# Patient Record
Sex: MALE | Race: WHITE | Employment: UNEMPLOYED | URBAN - METROPOLITAN AREA
[De-identification: names, ages, dates, MRNs, and addresses within clinical notes are randomized per-mention and may not be internally consistent; named-entity substitution may affect disease eponyms.]

---

## 2017-12-25 ENCOUNTER — HOSPITAL ENCOUNTER (EMERGENCY)
Age: 77
Discharge: HOME | End: 2017-12-25
Attending: EMERGENCY MEDICINE
Payer: SELF-PAY

## 2017-12-25 VITALS
BODY MASS INDEX: 33.87 KG/M2 | HEART RATE: 94 BPM | OXYGEN SATURATION: 97 % | HEIGHT: 65 IN | WEIGHT: 203.3 LBS | RESPIRATION RATE: 18 BRPM | DIASTOLIC BLOOD PRESSURE: 93 MMHG | TEMPERATURE: 97.8 F | SYSTOLIC BLOOD PRESSURE: 158 MMHG

## 2017-12-25 DIAGNOSIS — M10.9 ACUTE GOUT, UNSPECIFIED CAUSE, UNSPECIFIED SITE: Primary | ICD-10-CM

## 2017-12-25 PROCEDURE — 99283 EMERGENCY DEPT VISIT LOW MDM: CPT

## 2017-12-25 PROCEDURE — 74011250637 HC RX REV CODE- 250/637: Performed by: PHYSICIAN ASSISTANT

## 2017-12-25 PROCEDURE — 74011636637 HC RX REV CODE- 636/637: Performed by: PHYSICIAN ASSISTANT

## 2017-12-25 RX ORDER — PREDNISONE 50 MG/1
50 TABLET ORAL DAILY
Qty: 4 TAB | Refills: 0 | Status: SHIPPED | OUTPATIENT
Start: 2017-12-25 | End: 2017-12-29

## 2017-12-25 RX ORDER — INDOMETHACIN 25 MG/1
25 CAPSULE ORAL
Qty: 20 CAP | Refills: 0 | Status: SHIPPED | OUTPATIENT
Start: 2017-12-25 | End: 2017-12-30

## 2017-12-25 RX ORDER — TRAMADOL HYDROCHLORIDE 50 MG/1
100 TABLET ORAL
Status: COMPLETED | OUTPATIENT
Start: 2017-12-25 | End: 2017-12-25

## 2017-12-25 RX ORDER — PREDNISONE 20 MG/1
60 TABLET ORAL
Status: COMPLETED | OUTPATIENT
Start: 2017-12-25 | End: 2017-12-25

## 2017-12-25 RX ORDER — TRAMADOL HYDROCHLORIDE 50 MG/1
50-100 TABLET ORAL
Qty: 20 TAB | Refills: 0 | Status: SHIPPED | OUTPATIENT
Start: 2017-12-25

## 2017-12-25 RX ORDER — INDOMETHACIN 25 MG/1
25 CAPSULE ORAL
Qty: 20 CAP | Refills: 0 | Status: SHIPPED | OUTPATIENT
Start: 2017-12-25 | End: 2017-12-25

## 2017-12-25 RX ADMIN — TRAMADOL HYDROCHLORIDE 100 MG: 50 TABLET, COATED ORAL at 13:45

## 2017-12-25 RX ADMIN — PREDNISONE 60 MG: 20 TABLET ORAL at 13:45

## 2017-12-25 NOTE — ED NOTES
Emergency Department Nursing Plan of Care       The Nursing Plan of Care is developed from the Nursing assessment and Emergency Department Attending provider initial evaluation. The plan of care may be reviewed in the ED Provider note. The Plan of Care was developed with the following considerations:   Patient / Family readiness to learn indicated by:verbalized understanding  Persons(s) to be included in education: patient and family  Barriers to Learning/Limitations:Wil Barajas Bench    12/25/2017   1:04 PM    See nursing assessment    Patient is alert and oriented x 4 and in no acute distress at this time. Respirations are at a regular rate, depth, and pattern. Patient updated on plan of care and has no questions or concerns at this time.

## 2017-12-25 NOTE — ED PROVIDER NOTES
Patient is a 78 y.o. male presenting with gout. Gout    Pertinent negatives include no back pain and no neck pain. To ED with complaints of leg pains. Translation per blue phone translation and adult son with patients permission. Has had about one week of sharp pain, started at left 1rst MT, now in R foot some in both knees and also R great toe. Is out of his indomethacin. Sts he has h/o gout and this is same pain as prior gout flares. Denies any injury. No fevers. No skin injury to legs. Specifically denies calf pain, CP, SOB. Notes uses inhaler prn \"asthma\". Pain is worse when Left Toe > Right Toe touched and increased pain with walking. Pain is sharp and moderate to severe. Past Medical History:   Diagnosis Date    Asthma     Gout     Hypertension        History reviewed. No pertinent surgical history. History reviewed. No pertinent family history. Social History     Social History    Marital status:      Spouse name: N/A    Number of children: N/A    Years of education: N/A     Occupational History    Not on file. Social History Main Topics    Smoking status: Never Smoker    Smokeless tobacco: Not on file    Alcohol use No    Drug use: No    Sexual activity: Not on file     Other Topics Concern    Not on file     Social History Narrative    No narrative on file         ALLERGIES: Review of patient's allergies indicates no known allergies. Review of Systems   Constitutional: Negative for chills, fatigue and fever. HENT: Negative for congestion, rhinorrhea and sore throat. Eyes: Negative for pain and discharge. Respiratory: Negative for cough and shortness of breath. Cardiovascular: Negative for chest pain. Gastrointestinal: Negative for abdominal pain, nausea and vomiting. Genitourinary: Negative for dysuria, frequency and urgency. Musculoskeletal: Positive for gout. Negative for back pain and neck pain. As per HPI.  Toe and knee pains.    Skin: Negative for rash and wound. Neurological: Negative for seizures, syncope and headaches. Hematological: Does not bruise/bleed easily. Psychiatric/Behavioral: Negative for confusion. The patient is not nervous/anxious. All other systems reviewed and are negative. Vitals:    12/25/17 1251   BP: (!) 158/93   Pulse: 100   Resp: 22   Temp: 97.8 °F (36.6 °C)   SpO2: 95%   Weight: 92.2 kg (203 lb 4.8 oz)   Height: 5' 5\" (1.651 m)            Physical Exam   Constitutional: He is oriented to person, place, and time. He appears well-developed and well-nourished. HENT:   Head: Normocephalic and atraumatic. Mouth/Throat: Oropharynx is clear and moist.   Eyes: Conjunctivae and EOM are normal. Pupils are equal, round, and reactive to light. Neck: Normal range of motion. Neck supple. Cardiovascular: Normal rate, regular rhythm and normal heart sounds. No murmur heard. Pulmonary/Chest: Effort normal and breath sounds normal. He has no wheezes. He has no rales. Abdominal: Soft. Bowel sounds are normal. There is no tenderness. Musculoskeletal: Normal range of motion. Hips FROM B.   Knees with FROM B. No erythema. No effusion. Slight tenderness to medial/lateral joint lines B. No erythema. Left foot: slight swelling to great toe, slight warmth and very slight erythema. No skin disruption. Some pain with left toe ROM. Slight diffuse Left foot swelling. R foot- very slight swelling and slight warmth to great toe and distal medial foot. Some pain with GT ROM. Pedal pulses palp B. Sensation intact. Calves soft and and completely nonteder B. Neurological: He is alert and oriented to person, place, and time. He has normal reflexes. Skin: Skin is warm and dry. Psychiatric: He has a normal mood and affect. His behavior is normal.   Nursing note and vitals reviewed.        MDM  Number of Diagnoses or Management Options  Acute gout, unspecified cause, unspecified site: Diagnosis management comments: DDX: OA, gout, no injury to suggest fx. Calves completely soft and non tender, no clinical evidence of DVT. SX similar to prior gout symptoms. ED Course       Procedures          MEDICATIONS GIVEN:  Medications   predniSONE (DELTASONE) tablet 60 mg (60 mg Oral Given 12/25/17 1345)   traMADol (ULTRAM) tablet 100 mg (100 mg Oral Given 12/25/17 1345)       IMPRESSION:  1. Acute gout, unspecified cause, unspecified site        PLAN:  1. Discharge Medication List as of 12/25/2017  1:46 PM      START taking these medications    Details   predniSONE (DELTASONE) 50 mg tablet Take 1 Tab by mouth daily for 4 days. Start 12/26/2017, Print, Disp-4 Tab, R-0      traMADol (ULTRAM) 50 mg tablet Take 1-2 Tabs by mouth every eight (8) hours as needed for Pain. Max Daily Amount: 300 mg., Print, Disp-20 Tab, R-0      indomethacin (INDOCIN) 25 mg capsule Take 1 Cap by mouth three (3) times daily as needed for up to 5 days. With food. May start when completed with the prednisone., Print, Disp-20 Cap, R-0           2.    Follow-up Information     Follow up With Details Comments [address] [facility],[address] [facility] - [facility]   [address] [facility] [address] [place] [address]    [address]    [facility] EMERGENCY DEPT  If symptoms worsen [address] [facility]        Return to ED if worse

## 2017-12-25 NOTE — ED NOTES
.Patient has been instructed that they have been given tramadol* which contains opioids, benzodiazepines, or other sedating drugs. Patient is aware that they  will need to refrain from driving or operating heavy machinery after taking this medication. Patient also instructed that they need to avoid drinking alcohol and using other products containing opioids, benzodiazepines, or other sedating drugs. Patient verbalized understanding.

## 2017-12-25 NOTE — ED NOTES
Pt discharged per provider with no si/s of acute distress and with pt's family members. Pt escorted to car via wheelchair by ED staff. Nonverbal pain of 1/10.

## 2017-12-25 NOTE — DISCHARGE INSTRUCTIONS
ÇáäÞÑÓ: ÅÑÔÇÏÇÊ ÇáÑÚÇíÉ  [ Gout: Care Instructions ]  ÅÑÔÇÏÇÊ ÇáÑÚÇíÉ ÇáÎÇÕÉ Èß  ÇáäÞÑÓ åæ Ôßá ãä ÃÔßÇá ÇáÊåÇÈ [id number] ÇáäÇÊÌ Úä ÊÑÇßã ÈáæÑÇÊ ÍãÖ ÇáÈæáíß Ýí ÃÍÏ ÇáãÝÇÕá. íÊÓÈÈ Ýí ÍÏæË åÌæã ãÝÇÌÆ ãä KRSGH ÚÇÏÉ áãÝÕá æÇÍÏ æÊæÑãå æÇÍãÑÇÑå æÊÕáÈå æÎÇÕÉ ÇáÃÕÈÚ ÇáßÈíÑ. íÃÊí ÇáäÞÑÓ ÚÇÏÉ Ïæä ÓÈÈ. æáßä íãßä Ãä íÃÊí ãä ÊäÇæá [id number] (æÎÇÕÉ ÇáÈíÑÇ) Ãæ ÊäÇæá AFBENDAPT ÇáÈÍÑíÉ XYBDDDY ÇáÍãÑÇÁ. ßãÇ íãßä Ãä íÊÓÈÈ ÊäÇæá ÃÏæíÉ ãÚíäÉ¡ ãËá ãÏÑÇÊ ÇáÈæá Ãæ ÇáÃÓÈÑíä Ýí ÍÏæË äæÈÉ ÇáäÞÑÓ. íãßä áÊäÇæá ÃÏæíÊß ßãÇ Êã æÕÝåÇ æÇáãÊÇÈÚÉ ãÚ ÇáØÈíÈ ÈÇäÊÙÇã ãÓÇÚÏÊß Ýí ÊÌäÈ ÍÏæË äæÈÇÊ ÇáäÞÑÓ Ýí ÇáãÓÊÞÈá. ÊõÚÏ ÑÚÇíÉ ÇáãÊÇÈÚÉ ÌÒÁðÇ ÃÓÇÓíðÇ Ýí ÚáÇÌß æÓáÇãÊß. ÝÚáíß ÇáÍÑÕ Úáì ÊÑÊíÈ ÌãíÚ ãæÇÚíÏ ÒíÇÑÉ ÇáØÈíÈ LATGARCGR ÈåÇ¡ QNUYTNGT ÈØÈíÈß ÚäÏ [id number] ãä Ãí ãÔßáÇÊ. æãä ÇáÌíÏ ÃíÖðÇ Ãä ÊÚÑÝ äÊÇÆÌ HKESPXAZ æßÐáß [id number] ÈÞÇÆãÉ ÇáÃÏæíÉ ÇáÊí CKOWFSLN. ßíÝ íãßäß ÇáÇÚÊäÇÁ ÈäÝÓß Ýí SBKWWO¿   · íãßäßö æÖÚ ËáÌ Ãæ ÖãÇÏÉ ÈÇÑÏÉ Úáì ÇáãäØÞÉ [id number] áãÏÉ ÊÊÑÇæÍ ãä 10 ÏÞÇÆÞ Åáì 20 ÏÞíÞÉ Ýí ÇáãÑÉ ÇáæÇÍÏÉ. Marene Too ÞãÇÔ ÑÞíÞÉ Èíä ÇáËáÌ æÌáÏß.  · ÇÍãá ÇáØÑÝ ÇáãáÊåÈ Úáì æÓÇÏÉ ÚäÏ æÖÚ ÇáËáÌ Úáíå Ãæ Ãí æÞÊ ÊÌáÓ Ãæ ÊÓÊáÞí ÃËäÇÁ 3 ÃíÇã ÇáÊÇáíÉ. Blade Orlin ÅÈÞÇÁ ÇáãäØÞÉ JBADTVNRC Ýí ãÓÊæì ÃÚáì ãä ãÓÊæì ÇáÞáÈ. ÝåÐÇ ãä ÔÃäå Ãä íÓÇÚÏ Úáì ÊÞáíá ÇáÊæÑã. Eliana Mcneills · ÃÑÍ IMZWGBK ÇáãáÊåÈÉ. ÊÌäÈ ÇáÃäÔØÉ ÇáÊí ÊãËá ÍãáÇð Ãæ ÖÛØðÇ Úáì ÇáãÝÇÕá áÈÖÚÉ ÃíÇã. ÎÐ ÞÓØðÇ ãä MPJZTO ãä ÇáÃäÔØÉ NDBGEELV ÃËäÇÁ Çáíæã.  · ÊäÇæá ÇáÃÏæíÉ ÊãÇãðÇ ÍÓÈ ÊæÌíåÇÊ ÇáØÈíÈ. ÇÊÕá ÈÇáØÈíÈ ÅÐÇ ßäÊ ÊÚÊÞÏ Ãäß ÊæÇÌå ãÔßáÉ ÊÊÚáÞ ÈÇáÏæÇÁ.  · ÊäÇæá ÃÏæíÉ ÊÓßíä ÇáÃáã ÈÏÞÉ æÝÞðÇ áÅÑÔÇÏÇÊ ÇáØÈíÈ. o o ÅÐÇ ÃÚØÇß ÇáØÈíÈ ÏæÇÁð ãä ÇáÃÏæíÉ ÇáÊí ÊõÕÑÝ ÈæÕÝÉ ØÈíÉ áÚáÇÌ ÇáÃáã¡ IIMFJDF æÝÞ ÅÑÔÇÏÇÊå. o o ÅÐÇ áã ÊÊäÇæá ÏæÇÁð áÚáÇÌ ÇáÃáã ãä ÇáÃÏæíÉ ÇáÊí ÊõÕÑÝ ÈæÕÝÉ ØÈíÉ¡ ÝÇÓÃá ØÈíÈß ÅÐÇ ßÇä ÈÅãßÇäß ÊäÇæá ÏæÇÁ ãä ÇáÃÏæíÉ ÇáÊí ÊõÕÑÝ ÈÏæä æÕÝÉ ØÈíÉ.  · Þáá ãä ÊäÇæá [id number] ÇáÈÍÑíÉ IMGEENU ÇáÍãÑÇÁ.  · íÑÌì ÇáÑÌæÚ ááØÈíÈ ÞÈá ÊäÇæá ÇáãÔÑæÈÇÊ ÇáßÍæáíÉ.    · ÞÏ íÓÇÚÏ ÝÞÏÇä ÇáæÒä¡ ÅÐÇ ßäÊ ÊÚÇäí ãä ÒíÇÏÉ ÇáæÒä Ýí ÊÞáíá ÇáÅÕÇÈÉ ÈäæÈÇÊ ÇáäÞÑÓ. æáßä áÇ ÊÊÈÚ \"äÙÇãðÇ ÛÐÇÆíðÇ ÓÑíÚðÇ ááÊäÍíÝ. \" íãßä áÝÞÏÇä ÇáßËíÑ ãä ÇáæÒä Ýí æÞÊ ÞÕíÑ Ãä íÄÏí Åáì äæÈÉ äÞÑÓ. ãÊì íäÈÛí áß ÇáÇÊÕÇá áØáÈ ÇáãÓÇÚÏÉ¿  Úáíß ÇáÇÊÕÇá ÈØÈíÈß Úáì ÇáÝæÑ Ãæ ØáÈ ÑÚÇíÉ ØÈíÉ ÝæÑíÉ Ýí ÇáÍÇáÇÊ ÇáÊÇáíÉ:   · ÅÕÇÈÊß ÈÍãì.  · íÄáãß JVCRKB ÌÏðÇ æáÇ íãßäß ÇÓÊÎÏÇãå.  · ÊÚÇäí ãä Ãáã ÍÇÏ ãÝÇÌÆ Ãæ ÊæÑã ÛíÑ ãÊæÞÚ Ãæ ÇÍãÑÇÑ Ãæ ÓÎæäÉ Ýí ãÝÕá Ãæ ÃßËÑ. Úáíß ãÑÇÞÈÉ Ãí ÊÛíÑÇÊ ÊØÑÃ Úáì ÕÍÊß ÌíÏðÇ¡ CTFBAC Úáì JIFUMZX ÈÇáØÈíÈ Ýí BEEFLHF ÇáÊÇáíÉ:   · ÊÚÇäí ãä Ãáã MVZODELV.  · ÊÓæÁ ÃÚÑÇÖß Ãæ áã ÊÊÍÓä ÈÚÏ íæãíä Ãæ ËáÇËÉ ÃíÇã. Ãíä íãßä ãÚÑÝÉ ÇáãÒíÏ¿  ÇäÊÞÇá Åáì [email or web address] ÏÎæá Dudu.Osgood íãßäß ãÚÑÝÉ ÇáãÒíÏ ãä ÎáÇá ãÑÈÚ ÇáÈÍË \"ÇáäÞÑÓ: ÅÑÔÇÏÇÊ ÇáÑÚÇíÉ - [ Gout: Care Instructions ]. \"  © 2164-1056 [employer], [employer]. ÊãÊ ÊåíÆÉ ÅÑÔÇÏÇÊ ÇáÚäÇíÉ ÈãæÌÈ ÊÑÎíÕ ãä ãÎÊÕ ÇáÑÚÇíÉ ÇáÕÍíÉ áÏíß. ÅÐÇ ßÇäÊ áÏíß ÃíÉ [id number] Úä ÍÇáÉ ØÈíÉ Ãæ Ãí ãä åÐå CCCXUOPWS¡ ÝÊæÌå ÏæãðÇ FIBRDPO Åáì ãÎÊÕ ÇáÑÚÇíÉ ÇáÕÍíÉ. ÊäÝí ãäÙãÉ [employer] Lorren An ÖãÇä Ãæ Vidal L[employer] QSUTRNP åÐå [id number]. ÅÕÏÇÑ ÇáãÍÊæì: 11.4 ãÍÏøË ÇÚÊÈÇÑðÇ ãä: 30 ãÍÑã 1438           Gout: Care Instructions  Your Care Instructions    Gout is a form of arthritis caused by a buildup of uric acid crystals in a joint. It causes sudden attacks of pain, swelling, redness, and stiffness, usually in one joint, especially the big toe. Gout usually comes on without a cause. But it can be brought on by drinking alcohol (especially beer) or eating seafood and red meat. Taking certain medicines, such as diuretics or aspirin, also can bring on an attack of gout. Taking your medicines as prescribed and following up with your doctor regularly can help you avoid gout attacks in the future. Follow-up care is a key part of your treatment and safety. Be sure to make and go to all appointments, and call your doctor if you are having problems. It's also a good idea to know your test results and keep a list of the medicines you take.   How can you care for yourself at home? · If the joint is swollen, put ice or a cold pack on the area for 10 to 20 minutes at a time. Put a thin cloth between the ice and your skin. · Prop up the sore limb on a pillow when you ice it or anytime you sit or lie down during the next 3 days. Try to keep it above the level of your heart. This will help reduce swelling. · Rest sore joints. Avoid activities that put weight or strain on the joints for a few days. Take short rest breaks from your regular activities during the day. · Take your medicines exactly as prescribed. Call your doctor if you think you are having a problem with your medicine. · Take pain medicines exactly as directed. ¨ If the doctor gave you a prescription medicine for pain, take it as prescribed. ¨ If you are not taking a prescription pain medicine, ask your doctor if you can take an over-the-counter medicine. · Eat less seafood and red meat. · Check with your doctor before drinking alcohol. · Losing weight, if you are overweight, may help reduce attacks of gout. But do not go on a [employer]. \" Losing a lot of weight in a short amount of time can cause a gout attack. When should you call for help? Call your doctor now or seek immediate medical care if:  ? · You have a fever. ? · The joint is so painful you cannot use it. ? · You have sudden, unexplained swelling, redness, warmth, or severe pain in one or more joints. ? Watch closely for changes in your health, and be sure to contact your doctor if:  ? · You have joint pain. ? · Your symptoms get worse or are not improving after 2 or 3 days. Where can you learn more? Go to http://vicky-carrie.info/. Enter O842 in the search box to learn more about \"Gout: Care Instructions. \"  Current as of: October 31, 2016  Content Version: 11.4  © 3498-5288 [employer].  Care instructions adapted under license by [employer] (which disclaims liability or warranty for this information). If you have questions about a medical condition or this instruction, always ask your healthcare professional. [place] [address] any warranty or liability for your use of this information.